# Patient Record
Sex: FEMALE | Race: WHITE | NOT HISPANIC OR LATINO | URBAN - METROPOLITAN AREA
[De-identification: names, ages, dates, MRNs, and addresses within clinical notes are randomized per-mention and may not be internally consistent; named-entity substitution may affect disease eponyms.]

---

## 2023-08-11 ENCOUNTER — EMERGENCY (EMERGENCY)
Facility: HOSPITAL | Age: 18
LOS: 1 days | Discharge: HOME | End: 2023-08-11
Attending: EMERGENCY MEDICINE | Admitting: EMERGENCY MEDICINE
Payer: COMMERCIAL

## 2023-08-11 VITALS
SYSTOLIC BLOOD PRESSURE: 123 MMHG | WEIGHT: 149.91 LBS | TEMPERATURE: 98 F | HEART RATE: 77 BPM | RESPIRATION RATE: 20 BRPM | HEIGHT: 64 IN | DIASTOLIC BLOOD PRESSURE: 71 MMHG | OXYGEN SATURATION: 98 %

## 2023-08-11 PROCEDURE — 73630 X-RAY EXAM OF FOOT: CPT | Mod: 26,RT

## 2023-08-11 PROCEDURE — 99284 EMERGENCY DEPT VISIT MOD MDM: CPT

## 2023-08-11 PROCEDURE — 99284 EMERGENCY DEPT VISIT MOD MDM: CPT | Mod: 25

## 2023-08-11 PROCEDURE — 73630 X-RAY EXAM OF FOOT: CPT

## 2023-08-11 PROCEDURE — 73610 X-RAY EXAM OF ANKLE: CPT

## 2023-08-11 PROCEDURE — 73610 X-RAY EXAM OF ANKLE: CPT | Mod: 26,RT

## 2023-08-11 RX ORDER — IBUPROFEN 200 MG
600 TABLET ORAL ONCE
Refills: 0 | Status: COMPLETED | OUTPATIENT
Start: 2023-08-11 | End: 2023-08-11

## 2023-08-11 RX ADMIN — Medication 600 MILLIGRAM(S): at 20:45

## 2023-08-11 NOTE — ED ADULT NURSE NOTE - NSFALLUNIVINTERV_ED_ALL_ED
Bed/Stretcher in lowest position, wheels locked, appropriate side rails in place/Call bell, personal items and telephone in reach/Instruct patient to call for assistance before getting out of bed/chair/stretcher/Non-slip footwear applied when patient is off stretcher/[place] to call system/Physically safe environment - no spills, clutter or unnecessary equipment/Purposeful proactive rounding/Room/bathroom lighting operational, light cord in reach

## 2023-08-11 NOTE — ED PROVIDER NOTE - OBJECTIVE STATEMENT
Otherwise healthy 18-year-old female presents with right foot and ankle pain after injury earlier today at work.  Patient states she was tossing garbage into the dumpster at work.  Slipped on a curb and twisted right foot and then fell onto it.  Denies hitting head or LOC.  Does not take any blood thinners.  Pain localized to lateral aspect of right foot and ankle.  Has not take anything over-the-counter for pain or symptoms.  Patient ambulatory, but painful and putting full pressure on her foot.  Denies other injuries or complaints.  PCP Ruben Lr

## 2023-08-11 NOTE — ED PROVIDER NOTE - MUSCULOSKELETAL, MLM
tenderness and mild swelling to lateral aspect of right ankle and foot. no laxity appreciated. achilles tendon intact

## 2023-08-11 NOTE — ED PROVIDER NOTE - NSFOLLOWUPINSTRUCTIONS_ED_ALL_ED_FT
Recommend rest, ice, compression, elevation, support  Motrin over-the-counter, 600 mg 3 times a day with food  Follow-up with orthopedics symptoms continue or fail to improve, referral provided if needed          Foot Sprain    A foot sprain is an injury to one of the ligaments in the feet. Ligaments are strong tissues that connect bones to each other. The ligament can be stretched too much. In some cases, it may tear. A tear can be either partial or complete. The severity of the sprain depends on how much of the ligament was damaged or torn.    What are the causes?  This condition is usually caused by suddenly twisting or pivoting your foot.    What increases the risk?  You are more likely to develop this condition if:  You play a sport, such as basketball or football.  You exercise or play a sport without first warming up your muscles.  You start a new workout or sport.  You suddenly increase how long or hard you exercise or play a sport.  You have injured your foot or ankle before.  What are the signs or symptoms?  Symptoms of this condition start soon after an injury and include:  Pain, especially in the arch of your foot.  Bruising.  Swelling.  Being unable to walk or use your foot to support body weight.  How is this diagnosed?  This condition is diagnosed with a medical history and physical exam. You may also have imaging tests, such as:  X-rays to check for broken bones (fractures).  An MRI to see if the ligament is torn.  How is this treated?  Treatment for this condition depends on the severity of the sprain. Mild sprains and major sprains can be treated with:  Rest, ice, pressure (compression), and elevation (RICE). Elevation means raising your injured foot.  Keeping your foot in a fixed position (immobilization) for a period of time. This is done if your ligament is overstretched or partially torn. Your health care provider will apply a bandage, splint, or walking boot to keep your foot from moving until it heals.  Using crutches or a scooter for a few weeks to avoid bearing weight on your foot while it is healing.  Physical therapy exercises to improve movement and strength in your foot.  Major sprains may also be treated with:  Surgery. This is done if your ligament is fully torn and a procedure is needed to reconnect it to the bone.  A cast or splint. This will be needed after surgery. A cast or splint will need to stay on your foot while it heals.  Follow these instructions at home:  If you have a bandage, splint, or boot:    Wear it as told by your health care provider. Remove it only as told by your health care provider.  Loosen it if your toes tingle, become numb, or turn cold and blue.  Keep it clean and dry.  If you have a cast:    Do not put pressure on any part of the cast until it is fully hardened. This may take several hours.  Do not stick anything inside the cast to scratch your skin. Doing that increases your risk for infection.  Check the skin around the cast every day. Tell your health care provider about any concerns.  You may put lotion on dry skin around the edges of the cast. Do not put lotion on the skin underneath the cast.  Keep it clean and dry.  Bathing    Do not take baths, swim, or use a hot tub until your health care provider approves. Ask your health care provider if you may take showers. You may only be allowed to take sponge baths.  If the bandage, splint, boot, or cast is not waterproof:  Do not let it get wet.  Cover it with a watertight covering when you take a bath or shower.  Managing pain, stiffness, and swelling      If directed, put ice on the injured area. To do this:  If you have a removable bandage, splint, or boot, remove it as told by your health care provider.  Put ice in a plastic bag.  Place a towel between your skin and the bag, or between your cast and the bag.  Leave the ice on for 20 minutes, 2–3 times per day.  Remove the ice if your skin turns bright red. This is very important. If you cannot feel pain, heat, or cold, you have a greater risk of damage to the area.  Move your toes often to reduce stiffness and swelling.  Elevate the injured area above the level of your heart while you are sitting or lying down.  Activity    Do not use the injured foot to support your body weight until your health care provider says that you can. Use crutches or a scooter as told by your health care provider.  Ask your health care provider what activities are safe for you. Do exercises as told by your health care provider.  Gradually increase how much and how far you walk until your health care provider says it is safe to return to full activity.  Driving    Ask your health care provider if the medicine prescribed to you requires you to avoid driving or using machinery.  Ask your health care provider when it is safe to drive if you have a bandage, splint, boot, or cast on your foot.  General instructions    Take over-the-counter and prescription medicines only as told by your health care provider.  When you can walk without pain, wear supportive shoes that have stiff soles. Do not wear flip-flops. Do not walk barefoot.  Keep all follow-up visits. This is important.  Contact a health care provider if:  Medicine does not help your pain.  Your bruising or swelling gets worse or does not get better with treatment.  Your splint, boot, or cast is damaged.  Get help right away if:  You develop severe numbness or tingling in your foot.  Your foot turns blue, white, or gray, and it feels cold.  Summary  A foot sprain is an injury to one of the ligaments in the feet. Ligaments are strong tissues that connect bones to each other.  You may need a bandage, splint, boot, or cast to support your foot while it heals. Sometimes, surgery may be needed.  You may need physical therapy exercises to improve movement and strength in your foot.  This information is not intended to replace advice given to you by your health care provider. Make sure you discuss any questions you have with your health care provider.

## 2023-08-11 NOTE — ED ADULT NURSE NOTE - CHIEF COMPLAINT QUOTE
I was at work and went to [employer] into [employer] when I twisted my right foot and then fell onto it.  I have pain and swelling

## 2023-08-11 NOTE — ED ADULT NURSE NOTE - NS ED NURSE LEVEL OF CONSCIOUSNESS MENTAL STATUS
Received request via: Pharmacy    Was the patient seen in the last year in this department? Yes    Does the patient have an active prescription (recently filled or refills available) for medication(s) requested? No  
Awake/Alert/Cooperative

## 2023-08-11 NOTE — ED PROVIDER NOTE - CARE PROVIDER_API CALL
Walt Palumbo.  Orthopaedic Surgery  [address]], [address]  [place] [address]  Phone: [phone]  Fax: [phone]  Follow Up Time: 1-3 Days

## 2023-08-11 NOTE — ED PROVIDER NOTE - PROGRESS NOTE DETAILS
Patient stable.  X-ray results discussed with patient.  No evidence of acute fracture.  Discussed symptoms likely due to sprain.  Recommend rest, ice, compression, elevation, and support.  Ace wrap applied.  Patient declined splint or crutches.  Recommend follow-up with orthopedics if pain continues or fails to improve, referral provided if needed

## 2023-08-11 NOTE — ED ADULT NURSE NOTE - OBJECTIVE STATEMENT
the patient rolled over on her right ankle today and then fell on it while tossing garbage in a dumpster today at work.  swelling noted laterally.  able to ambulate with limp

## 2023-08-11 NOTE — ED PROVIDER NOTE - PATIENT PORTAL LINK FT
You can access the FollowMyHealth Patient Portal offered by [facility] by registering at the following website: [email or web address]. By joining [employer]s FollowMyHealth portal, you will also be able to view your health information using other applications (apps) compatible with our system.

## 2023-08-11 NOTE — ED PROVIDER NOTE - ATTENDING APP SHARED VISIT CONTRIBUTION OF CARE
I have seen the patient with the PER and agree with above examination and assessment and plan with the following addendum:        Focused PE:   General: NAD, alert and oriented  Head: Normocephalic, atraumatic  Eyes: PERRLA, EOMI  Cardiac: RRR, no murmurs, rubs or gallops  Resp: CTA, no wheezes, rales or ronchi  GI: Nondistended, nontender, no rebound or guarding  MSK: FROM, tenderness to R. ankle and foot on lateral side.   Neuro: Alert and oriented, no focal deficits.     Ddx: Sprain/ ro fracture  Plan:x ray, splint, pain control, d/c to fu w ortho.

## 2023-08-11 NOTE — ED PROVIDER NOTE - NS ED ATTENDING STATEMENT MOD
This was a shared visit with the PER. I reviewed and verified the documentation and independently performed the documented:

## 2023-08-11 NOTE — ED ADULT TRIAGE NOTE - CHIEF COMPLAINT QUOTE
I was at work and went to [employer] when I twisted my right foot and then fell onto it.  I have pain and swelling

## 2025-04-22 ENCOUNTER — APPOINTMENT (OUTPATIENT)
Dept: ORTHOPEDIC SURGERY | Facility: CLINIC | Age: 20
End: 2025-04-22
Payer: COMMERCIAL

## 2025-04-22 ENCOUNTER — NON-APPOINTMENT (OUTPATIENT)
Age: 20
End: 2025-04-22

## 2025-04-22 VITALS
SYSTOLIC BLOOD PRESSURE: 118 MMHG | HEART RATE: 94 BPM | DIASTOLIC BLOOD PRESSURE: 63 MMHG | WEIGHT: 145 LBS | BODY MASS INDEX: 24.75 KG/M2 | OXYGEN SATURATION: 99 % | HEIGHT: 64 IN | TEMPERATURE: 97.9 F

## 2025-04-22 DIAGNOSIS — G56.21 LESION OF ULNAR NERVE, RIGHT UPPER LIMB: ICD-10-CM

## 2025-04-22 DIAGNOSIS — Z78.9 OTHER SPECIFIED HEALTH STATUS: ICD-10-CM

## 2025-04-22 DIAGNOSIS — G56.20 LESION OF ULNAR NERVE, UNSPECIFIED UPPER LIMB: ICD-10-CM

## 2025-04-22 PROBLEM — Z00.00 ENCOUNTER FOR PREVENTIVE HEALTH EXAMINATION: Status: ACTIVE | Noted: 2025-04-22

## 2025-04-22 PROCEDURE — 99203 OFFICE O/P NEW LOW 30 MIN: CPT | Mod: NC

## 2025-05-13 ENCOUNTER — APPOINTMENT (OUTPATIENT)
Dept: PHYSICAL MEDICINE AND REHAB | Facility: CLINIC | Age: 20
End: 2025-05-13
Payer: COMMERCIAL

## 2025-05-13 VITALS
SYSTOLIC BLOOD PRESSURE: 104 MMHG | HEART RATE: 85 BPM | WEIGHT: 145 LBS | BODY MASS INDEX: 24.75 KG/M2 | HEIGHT: 64 IN | DIASTOLIC BLOOD PRESSURE: 71 MMHG

## 2025-05-13 DIAGNOSIS — G56.20 LESION OF ULNAR NERVE, UNSPECIFIED UPPER LIMB: ICD-10-CM

## 2025-05-13 DIAGNOSIS — Z78.9 OTHER SPECIFIED HEALTH STATUS: ICD-10-CM

## 2025-05-13 DIAGNOSIS — M79.643 PAIN IN UNSPECIFIED HAND: ICD-10-CM

## 2025-05-13 DIAGNOSIS — R20.2 PARESTHESIA OF SKIN: ICD-10-CM

## 2025-05-13 PROCEDURE — 99203 OFFICE O/P NEW LOW 30 MIN: CPT

## 2025-05-13 RX ORDER — CETIRIZINE HYDROCHLORIDE 10 MG/1
TABLET, FILM COATED ORAL
Refills: 0 | Status: ACTIVE | COMMUNITY

## 2025-05-16 ENCOUNTER — APPOINTMENT (OUTPATIENT)
Dept: PHYSICAL MEDICINE AND REHAB | Facility: CLINIC | Age: 20
End: 2025-05-16
Payer: COMMERCIAL

## 2025-05-16 VITALS
SYSTOLIC BLOOD PRESSURE: 95 MMHG | DIASTOLIC BLOOD PRESSURE: 76 MMHG | BODY MASS INDEX: 24.75 KG/M2 | WEIGHT: 145 LBS | HEART RATE: 110 BPM | HEIGHT: 64 IN

## 2025-05-16 DIAGNOSIS — M25.521 PAIN IN RIGHT ELBOW: ICD-10-CM

## 2025-05-16 DIAGNOSIS — R20.0 ANESTHESIA OF SKIN: ICD-10-CM

## 2025-05-16 DIAGNOSIS — G89.29 PAIN IN RIGHT ELBOW: ICD-10-CM

## 2025-05-16 DIAGNOSIS — R20.2 ANESTHESIA OF SKIN: ICD-10-CM

## 2025-05-16 DIAGNOSIS — G56.21 LESION OF ULNAR NERVE, RIGHT UPPER LIMB: ICD-10-CM

## 2025-05-16 PROCEDURE — 95913 NRV CNDJ TEST 13/> STUDIES: CPT

## 2025-05-16 PROCEDURE — 95886 MUSC TEST DONE W/N TEST COMP: CPT
